# Patient Record
Sex: FEMALE | Race: WHITE | URBAN - METROPOLITAN AREA
[De-identification: names, ages, dates, MRNs, and addresses within clinical notes are randomized per-mention and may not be internally consistent; named-entity substitution may affect disease eponyms.]

---

## 2017-05-03 ENCOUNTER — APPOINTMENT (RX ONLY)
Dept: URBAN - METROPOLITAN AREA CLINIC 7 | Facility: CLINIC | Age: 72
Setting detail: DERMATOLOGY
End: 2017-05-03

## 2017-05-03 DIAGNOSIS — Z41.9 ENCOUNTER FOR PROCEDURE FOR PURPOSES OTHER THAN REMEDYING HEALTH STATE, UNSPECIFIED: ICD-10-CM

## 2017-05-03 DIAGNOSIS — L57.0 ACTINIC KERATOSIS: ICD-10-CM

## 2017-05-03 DIAGNOSIS — L82.1 OTHER SEBORRHEIC KERATOSIS: ICD-10-CM

## 2017-05-03 DIAGNOSIS — Z87.891 PERSONAL HISTORY OF NICOTINE DEPENDENCE: ICD-10-CM

## 2017-05-03 DIAGNOSIS — Z71.89 OTHER SPECIFIED COUNSELING: ICD-10-CM

## 2017-05-03 DIAGNOSIS — L81.4 OTHER MELANIN HYPERPIGMENTATION: ICD-10-CM

## 2017-05-03 PROCEDURE — 17003 DESTRUCT PREMALG LES 2-14: CPT

## 2017-05-03 PROCEDURE — ? COUNSELING

## 2017-05-03 PROCEDURE — ? PRODUCT LINE (ANTI-AGING)

## 2017-05-03 PROCEDURE — 17000 DESTRUCT PREMALG LESION: CPT

## 2017-05-03 PROCEDURE — ? COSMETIC CONSULTATION: LASER RESURFACING

## 2017-05-03 PROCEDURE — ? COSMETIC CONSULTATION: FILLERS

## 2017-05-03 PROCEDURE — 99202 OFFICE O/P NEW SF 15 MIN: CPT | Mod: 25

## 2017-05-03 PROCEDURE — ? LIQUID NITROGEN

## 2017-05-03 PROCEDURE — ? OTHER

## 2017-05-03 ASSESSMENT — LOCATION DETAILED DESCRIPTION DERM
LOCATION DETAILED: RIGHT NASAL DORSUM
LOCATION DETAILED: LEFT MEDIAL ZYGOMA
LOCATION DETAILED: LEFT CENTRAL MALAR CHEEK
LOCATION DETAILED: UPPER STERNUM
LOCATION DETAILED: RIGHT PROXIMAL DORSAL FOREARM
LOCATION DETAILED: RIGHT RADIAL DORSAL HAND
LOCATION DETAILED: LEFT PROXIMAL DORSAL FOREARM
LOCATION DETAILED: RIGHT SUPERIOR CENTRAL MALAR CHEEK
LOCATION DETAILED: LEFT ULNAR DORSAL HAND
LOCATION DETAILED: LEFT INFERIOR CENTRAL MALAR CHEEK
LOCATION DETAILED: RIGHT INFERIOR LATERAL FOREHEAD

## 2017-05-03 ASSESSMENT — LOCATION ZONE DERM
LOCATION ZONE: ARM
LOCATION ZONE: FACE
LOCATION ZONE: TRUNK
LOCATION ZONE: NOSE
LOCATION ZONE: HAND

## 2017-05-03 ASSESSMENT — LOCATION SIMPLE DESCRIPTION DERM
LOCATION SIMPLE: RIGHT FOREARM
LOCATION SIMPLE: RIGHT HAND
LOCATION SIMPLE: RIGHT FOREHEAD
LOCATION SIMPLE: LEFT CHEEK
LOCATION SIMPLE: RIGHT CHEEK
LOCATION SIMPLE: CHEST
LOCATION SIMPLE: LEFT HAND
LOCATION SIMPLE: LEFT FOREARM
LOCATION SIMPLE: NOSE
LOCATION SIMPLE: LEFT ZYGOMA

## 2017-05-03 NOTE — PROCEDURE: PRODUCT LINE (ANTI-AGING)
Product 15 Price (In Dollars - Numeric Only, No Special Characters Or $): 28.00
Name Of Product 23: Ciclifate
Product 53 Price (In Dollars - Numeric Only, No Special Characters Or $): 0.00
Product 31 Application Directions: Apply as directed
Product 80 Units: 0
Name Of Product 12: Skinceuticals Vitamin CE Serum
Name Of Product 41: Vital Lift
Name Of Product 26: Adonis Kolb
Name Of Product 13: Skinceutical Retinol 0.5%
Name Of Product 1: Avene Dermocleansing mild
Name Of Product 30: neorossytis ronnie
Send Charges To Patient Encounter: Yes
Name Of Product 7: Lumiere eye cream
Product 19 Application Directions: Mothers day kit
Product 12 Application Directions: Apply 4-5 drops Qam
Product 41 Price (In Dollars - Numeric Only, No Special Characters Or $): 150.00
Name Of Product 9: Saturday Night Brightener
Name Of Product 14: Tea
Product 13 Application Directions: Apply small amount qhs
Product 12 Units: 1
Product 18 Application Directions: Apply in an upwards motion on the neck BID
Name Of Product 5: Skinceuticals toner
Product 41 Application Directions: Recommended Parvez Special
Name Of Product 22: Skinceuticals phloretin
Product 25 Application Directions: Wash daily and rinse
Product 32 Application Directions: Use as directed
Name Of Product 28: SkinCeuticals Triple Lipid
Name Of Product 36: Revisions Intellishade
Name Of Product 21: Elta clear tinted Sunscreen
Render Product Pricing In Note: No
Product 6 Application Directions: VISIA - skin care regimen and chemical peels
Product 8 Application Directions: Applied Neotensil to lower eyelids and patient let set for 5 minutes. Before and after pictures were taken.\\n\\nExcellent result- patient purchased box
Name Of Product 20: SkinCeuticals Metacell
Name Of Product 11: Post laser procedure kit
Name Of Product 2: Auriderm kit
Name Of Product 35: LHA Cleansing Wash
Name Of Product 33: Skinceuticals pigment corrector
Name Of Product 34: Neocutis Biocream
Product 9 Application Directions: Apply q week start with 15 minutes then rinse. Work up to over night use as tolerated. Side effects discussed.
Product 31 Price (In Dollars - Numeric Only, No Special Characters Or $): 145.00
Name Of Product 19: Obagi Eye Cream
Product 6 Price (In Dollars - Numeric Only, No Special Characters Or $): Complimentary
Name Of Product 10: Skinceuticals Retinol 0.1%
Product 26 Application Directions: Apply Q am to face
Product 27 Application Directions: Wash face BID then rinse
Product 32 Price (In Dollars - Numeric Only, No Special Characters Or $): 100.00
Name Of Product 29: Neojessica Haganex
Name Of Product 3: Avene Skin Recovery Cream
Name Of Product 31: Advanced Brightening System
Name Of Product 8: Neotensil Demo
Product 24 Price (In Dollars - Numeric Only, No Special Characters Or $): 124.00
Product 10 Price (In Dollars - Numeric Only, No Special Characters Or $): 63.00
Name Of Product 18: Marleeifirm
Product 1 Application Directions: Apply  am
Name Of Product 4: Skinceuticals wash
Product 14 Application Directions: Apply small amount q hs\\nstrict sunscreen use with SPF 30 or above every morning
Name Of Product 16: AGE Complex
Name Of Product 32: Neocutis Holiday pack
Product 9 Price (In Dollars - Numeric Only, No Special Characters Or $): 58.00
Product 17 Application Directions: Apply Qam
Name Of Product 6: FREDIS
Name Of Product 27: Elta Foaming Wash
Name Of Product 25: Glytone 0.5% foaming face wash
Product 13 Price (In Dollars - Numeric Only, No Special Characters Or $): 57.00
Detail Level: Zone
Product 22 Price (In Dollars - Numeric Only, No Special Characters Or $): 172.00
Product 11 Price (In Dollars - Numeric Only, No Special Characters Or $): 73.00
Product 28 Application Directions: trial size provided
Product 8 Price (In Dollars - Numeric Only, No Special Characters Or $): 30.00
Name Of Product 15: Avene dermocleansing milk

## 2017-05-03 NOTE — PROCEDURE: OTHER
Other (Free Text): -Visia with Beatrice
Note Text (......Xxx Chief Complaint.): This diagnosis correlates with the
Detail Level: Zone

## 2017-05-03 NOTE — PROCEDURE: LIQUID NITROGEN
Post-Care Instructions: I reviewed with the patient in detail post-care instructions. Patient is to wear sunprotection, and avoid picking at any of the treated lesions. Pt may apply Vaseline to crusted or scabbing areas.
Render Post-Care Instructions In Note?: yes
Number Of Freeze-Thaw Cycles: 2 freeze-thaw cycles
Detail Level: Simple
Duration Of Freeze Thaw-Cycle (Seconds): 10
Consent: The patient's consent was obtained including but not limited to risks of crusting, scabbing, blistering, scarring, darker or lighter pigmentary change, recurrence, incomplete removal and infection.

## 2017-05-03 NOTE — PROCEDURE: COUNSELING
Detail Level: Zone
Detail Level: Detailed
Detail Level: Generalized
Quality 226: Preventive Care And Screening: Tobacco Use: Screening And Cessation Intervention: Patient screened for tobacco and is an ex-smoker

## 2019-03-11 ENCOUNTER — APPOINTMENT (RX ONLY)
Dept: URBAN - METROPOLITAN AREA CLINIC 111 | Facility: CLINIC | Age: 74
Setting detail: DERMATOLOGY
End: 2019-03-11

## 2019-03-11 DIAGNOSIS — Z00.00 ENCOUNTER FOR GENERAL ADULT MEDICAL EXAMINATION WITHOUT ABNORMAL FINDINGS: ICD-10-CM

## 2019-03-11 DIAGNOSIS — L57.0 ACTINIC KERATOSIS: ICD-10-CM

## 2019-03-11 DIAGNOSIS — L73.8 OTHER SPECIFIED FOLLICULAR DISORDERS: ICD-10-CM

## 2019-03-11 PROBLEM — Z71.1 PERSON WITH FEARED HEALTH COMPLAINT IN WHOM NO DIAGNOSIS IS MADE: Status: ACTIVE | Noted: 2019-03-11

## 2019-03-11 PROBLEM — Z85.3 PERSONAL HISTORY OF MALIGNANT NEOPLASM OF BREAST: Status: ACTIVE | Noted: 2019-03-11

## 2019-03-11 PROCEDURE — ? DIAGNOSIS COMMENT

## 2019-03-11 PROCEDURE — ? SEPARATE AND IDENTIFIABLE DOCUMENTATION

## 2019-03-11 PROCEDURE — ? PRODUCT LINE (SUNSCREENS)

## 2019-03-11 PROCEDURE — ? TREATMENT REGIMEN

## 2019-03-11 PROCEDURE — ? COUNSELING

## 2019-03-11 PROCEDURE — 99213 OFFICE O/P EST LOW 20 MIN: CPT

## 2019-03-11 ASSESSMENT — LOCATION DETAILED DESCRIPTION DERM
LOCATION DETAILED: RIGHT INFERIOR MEDIAL MALAR CHEEK
LOCATION DETAILED: RIGHT MENTAL CREASE

## 2019-03-11 ASSESSMENT — LOCATION SIMPLE DESCRIPTION DERM
LOCATION SIMPLE: CHIN
LOCATION SIMPLE: RIGHT CHEEK

## 2019-03-11 ASSESSMENT — LOCATION ZONE DERM: LOCATION ZONE: FACE

## 2019-03-11 NOTE — PROCEDURE: PRODUCT LINE (SUNSCREENS)
Product 69 Price (In Dollars - Numeric Only, No Special Characters Or $): 0.00
Product 2 Units: 0
Name Of Product 3: Elta MD UV Daily
Name Of Product 8: Elta MD UV Sport
Product 3 Price (In Dollars - Numeric Only, No Special Characters Or $): 24.00
Product 8 Price (In Dollars - Numeric Only, No Special Characters Or $): 22.00
Name Of Product 4: Elta MD UV Elements
Name Of Product 9: Eryfotona Actinica
Product 4 Price (In Dollars - Numeric Only, No Special Characters Or $): 33.00
Product 9 Price (In Dollars - Numeric Only, No Special Characters Or $): 50.00
Product 9 Units: 1
Name Of Product 5: Elta MD UV Facial
Send Charges To Patient Encounter: Yes
Product 5 Price (In Dollars - Numeric Only, No Special Characters Or $): 26.00
Name Of Product 10: Jungle Screen
Name Of Product 1: Elta MD UV Aero
Product 1 Price (In Dollars - Numeric Only, No Special Characters Or $): 25.0
Name Of Product 6: Elta MD UV Lotion
Detail Level: Zone
Product 6 Price (In Dollars - Numeric Only, No Special Characters Or $): 27.00
Name Of Product 2: Elta MD UV Clear
Name Of Product 7: Elta MD UV Pure
Product 2 Price (In Dollars - Numeric Only, No Special Characters Or $): 28.00
Product 7 Price (In Dollars - Numeric Only, No Special Characters Or $): 23.00